# Patient Record
Sex: FEMALE | URBAN - METROPOLITAN AREA
[De-identification: names, ages, dates, MRNs, and addresses within clinical notes are randomized per-mention and may not be internally consistent; named-entity substitution may affect disease eponyms.]

---

## 2019-08-16 ENCOUNTER — APPOINTMENT (RX ONLY)
Dept: URBAN - METROPOLITAN AREA CLINIC 152 | Facility: CLINIC | Age: 59
Setting detail: DERMATOLOGY
End: 2019-08-16

## 2019-08-16 DIAGNOSIS — L81.1 CHLOASMA: ICD-10-CM | Status: INADEQUATELY CONTROLLED

## 2019-08-16 PROBLEM — I10 ESSENTIAL (PRIMARY) HYPERTENSION: Status: ACTIVE | Noted: 2019-08-16

## 2019-08-16 PROCEDURE — ? PRESCRIPTION

## 2019-08-16 PROCEDURE — ? COUNSELING

## 2019-08-16 PROCEDURE — ? DIAGNOSIS COMMENT

## 2019-08-16 PROCEDURE — 99203 OFFICE O/P NEW LOW 30 MIN: CPT

## 2019-08-16 RX ORDER — FLUOCINOLONE ACETONIDE, HYDROQUINONE, AND TRETINOIN .1; 40; .5 MG/G; MG/G; MG/G
CREAM TOPICAL QHS
Qty: 1 | Refills: 2 | Status: ERX | COMMUNITY
Start: 2019-08-16

## 2019-08-16 RX ADMIN — FLUOCINOLONE ACETONIDE, HYDROQUINONE, AND TRETINOIN: .1; 40; .5 CREAM TOPICAL at 00:00

## 2019-08-16 NOTE — HPI: DISCOLORATION
Additional History: Patient has been using chika hip oil and witch hazel for the past 8 months with some improvement. She has never been treated with prescription medications in the past.

## 2019-08-16 NOTE — PROCEDURE: DIAGNOSIS COMMENT
Detail Level: Simple
Comment: NP. Melasma on cheeks, not previously treated. Rx for Tri-Amy to apply QHS or BID if tolerating well x2 mos, then f/u for re-eval. Photos taken to monitor progress.

## 2019-08-16 NOTE — PROCEDURE: MIPS QUALITY
Quality 130: Documentation Of Current Medications In The Medical Record: Current Medications Documented
Detail Level: Detailed
Quality 131: Pain Assessment And Follow-Up: Pain assessment using a standardized tool is documented as negative, no follow-up plan required
Quality 226: Preventive Care And Screening: Tobacco Use: Screening And Cessation Intervention: Patient screened for tobacco use and is an ex/non-smoker

## 2020-03-10 RX ORDER — FLUOCINOLONE ACETONIDE, HYDROQUINONE, AND TRETINOIN .1; 40; .5 MG/G; MG/G; MG/G
CREAM TOPICAL QHS
Qty: 1 | Refills: 2 | Status: ERX

## 2021-10-15 ENCOUNTER — RX ONLY (OUTPATIENT)
Age: 61
Setting detail: RX ONLY
End: 2021-10-15

## 2021-10-15 RX ORDER — FLUOCINOLONE ACETONIDE, HYDROQUINONE, AND TRETINOIN .1; 40; .5 MG/G; MG/G; MG/G
CREAM TOPICAL
Qty: 30 | Refills: 0 | Status: ERX

## 2021-10-15 RX ORDER — FLUOCINOLONE ACETONIDE, HYDROQUINONE, AND TRETINOIN .1; 40; .5 MG/G; MG/G; MG/G
CREAM TOPICAL
Qty: 30 | Refills: 0 | Status: ERX | COMMUNITY
Start: 2021-10-15

## 2025-03-21 ENCOUNTER — APPOINTMENT (OUTPATIENT)
Dept: URBAN - METROPOLITAN AREA CLINIC 151 | Facility: CLINIC | Age: 65
Setting detail: DERMATOLOGY
End: 2025-03-21

## 2025-03-21 DIAGNOSIS — L81.1 CHLOASMA: ICD-10-CM

## 2025-03-21 PROCEDURE — ? PRESCRIPTION

## 2025-03-21 PROCEDURE — ? COUNSELING

## 2025-03-21 PROCEDURE — ? DIAGNOSIS COMMENT

## 2025-03-21 PROCEDURE — ? PRESCRIPTION MEDICATION MANAGEMENT

## 2025-03-21 PROCEDURE — 99204 OFFICE O/P NEW MOD 45 MIN: CPT

## 2025-03-21 PROCEDURE — ? PHOTO-DOCUMENTATION

## 2025-03-21 NOTE — PROCEDURE: PRESCRIPTION MEDICATION MANAGEMENT
Render In Strict Bullet Format?: No
Initiate Treatment: SM20 30G (0.1% tretinoin, 0.025% fluocinolone, 8% hydroquinone, 4% niacinamide, 1% kojic acid (bulk))\\nSig: Apply topically to dark spots on face QHS x 3 months, take 1 month break then repeat as needed. Utilize sun protection daily.
Detail Level: Zone

## 2025-03-21 NOTE — PROCEDURE: DIAGNOSIS COMMENT
Detail Level: Simple
Comment: Pt reports improvement w/ tri-cally cream, however, sun exposure worsens dark spots. States she takes olmesartan for high blood pressure. Advised it contains hydrochlorothiazide which causes photosensitivity. Samples of mineral sunscreens provided today and recc'd Elta MD UV Clear SPF 45. Instructed pt to reapply SPF vigilantly. Will switch to SM topical lightening compound.
Render Risk Assessment In Note?: no